# Patient Record
Sex: MALE | Race: WHITE | NOT HISPANIC OR LATINO | Employment: FULL TIME | URBAN - METROPOLITAN AREA
[De-identification: names, ages, dates, MRNs, and addresses within clinical notes are randomized per-mention and may not be internally consistent; named-entity substitution may affect disease eponyms.]

---

## 2017-12-21 ENCOUNTER — ALLSCRIPTS OFFICE VISIT (OUTPATIENT)
Dept: OTHER | Facility: OTHER | Age: 52
End: 2017-12-21

## 2017-12-22 NOTE — CONSULTS
Assessment   1  Hyperlipidemia (272 4) (E78 5)  2  Vertigo (780 4) (R42)  3  Family history of hypertension (V17 49) (Z82 49) : Mother, Father  4  Family history of coronary artery disease (V17 3) (Z82 49) : Father  5  Benign hypertension (401 1) (I10)    Plan   Benign hypertension    · Follow-up visit in 4 Months Evaluation and Treatment  Follow-up  Status: Hold For -    Scheduling  Requested for: [id number]  Ordered; For: Benign hypertension;  Ordered By: Shad Persons  Performed:   Due:     [id number]    Discussion/Summary      1  hypertension: Reviewed blood pressure log, and systolic blood pressure averaging in the 130s in 140s  I do feel he needs better blood pressure control  At this time we have collectively decided to pursue lifestyle modification, and I have recommended low-sodium diet, and some weight loss  His weight is just within the overweight range, and a 5 pound weight loss will help  He will continue monitoring his blood pressure at home, and I will see him in 4 months  If his blood pressures remain greater than 140 millimeter of mercury, will start an antihypertensive next visit  Dyslipidemia: Will obtain lipid panel from PCP's office  Continue atorvastatin for now  History of QT prolongation: Likely secondary to sertraline, however, ECG today reveals normal QT interval  Patient advised to check with pharmacist if he takes any over-the-counter medications to be sure will not interact with sertraline 2 prolonged QT interval     Overweight: BMI 26, mildly elevated in overweight range  Heart healthy diet and continued exercise recommended w/ goal of losing -- 5 lb over next 4 months  in 4 months to readdress HTN  Chief Complaint   np establish/ fam hx      History of Present Illness   Cardiology HPI Free Text Note Form [facility]: This is a very pleasant 55-year-old male with no prior cardiac history  He has a history of dyslipidemia and has been on atorvastatin for many years   In 2015 he was evaluated by Dr Mateo Lynn at [facility] for symptoms of vertigo secondary to an ear infection, and prolonged QT interval secondary to sertraline  He underwent a Holter monitor in February 2015 which was unremarkable, revealing rare PACs and PVCs  presents today to establish cardiovascular care for risk reduction purposes  His father had a myocardial infarction in his 76s  He has no history of tobacco abuse  He exercises several times a week including playing tennis, basketball, and jogging, and feels well with exertional activities without any symptoms  He states his cholesterol is well controlled by his PCP, on atorvastatin  His wife has been checking his blood pressures at home, and systolic blood pressures have been ranging from the 1 teens, up to the 150s, averaging in the 130s and 140s  His diet is fairly good, and he does not consume too much sodium  Review of Systems           Cardiac: No complaints of chest pain, no palpitations, no fainiting  Skin: No complaints of nonhealing sores or skin rash  Genitourinary: No complaints of recurrent urinary tract infections, frequent urination at night, difficult urination, blood in urine, kidney stones, loss of bladder control, no kidney or prostate problems, no erectile dysfunction  Psychological: panic attacks, but-- no depression,-- no anxiety,-- no difficulty concentrating-- and-- no palpitations present  General: No complaints of trouble sleeping, lack of energy, fatigue, appetite changes, weight changes, fever, frequent infections, or night sweats  Respiratory: No complaints of shortness of breath, cough with sputum, or wheezing  HEENT: No complaints of serious problems, hearing problems, nose problems, throat problems, or snoring  Gastrointestinal: No complaints of liver problems, nausea, vomiting, heartburn, constipation, bloody stools, diarrhea, problems swallowing, adbominal pain, or rectal bleeding  Hematologic: No complaints of bleeding disorders, anemia, blood clots, or excessive brusing  Musculoskeletal: arthritis-- and-- back pain, but-- no swelling/pain                ROS reviewed  Active Problems   1  Hyperlipidemia (272 4) (E78 5)  2  Vertigo (780 4) (R42)    Past Medical History      The active problems and past medical history were reviewed and updated today  Surgical History      The surgical history was reviewed and updated today  Family History   Mother    · Family history of hypertension (V17 49) (Z82 49)  Father    · Family history of coronary artery disease (V17 3) (Z82 49)   · Family history of hypertension (V17 49) (Z82 49)  Family History Reviewed: The family history was reviewed and updated today  Social History    · Never a smoker  The social history was reviewed and updated today  Current Meds   1  Aspir-81 81 MG Oral Tablet Delayed Release; one tablet by mouth daily; Therapy: [id number] to Recorded  2  Atorvastatin Calcium 40 MG Oral Tablet; Take 1 tablet daily; Therapy: 36Dno2577 to Recorded  3  Sertraline HCl - 50 MG Oral Tablet; Take 1 tablet daily; Therapy: 61Sol3077 to (Evaluate:28Njv2725) Recorded     The medication list was reviewed and updated today  Allergies   1  No Known Drug Allergies    Vitals   Signs   Heart Rate: 59  Pulse Quality: Normal, L Radial  Respiration Quality: Normal  Respiration: 16  Systolic: 761  Diastolic: 92  Height: 6 ft 1 in  Weight: 201 lb   BMI Calculated: 26 52  BSA Calculated: 2 16    Physical Exam        Constitutional - General appearance: No acute distress, well appearing and well nourished  Eyes - Conjunctiva and Sclera examination: Conjunctiva pink, sclera anicteric  Neck - Normal, no JVD   Pulmonary - Respiratory effort: No signs of respiratory distress  -- Auscultation of lungs: Clear to auscultation         Cardiovascular - Auscultation of heart: Normal rate and rhythm, normal S1 and S2, no murmurs  -- Pedal pulses: Normal, 2+ bilaterally  -- Examination of extremities for edema and/or varicosities: Normal        Abdomen - Soft  Musculoskeletal - Gait and station: Normal gait  Skin - Skin: Normal without rashes  Skin is warm and well perfused  Neurologic - Speech normal  No focal deficits  Psychiatric - Orientation to person, place, and time: Normal       End of Encounter Meds   1  Aspir-81 81 MG Oral Tablet Delayed Release; one tablet by mouth daily; Therapy: 87Pct6840 to Recorded  2  Atorvastatin Calcium 40 MG Oral Tablet; Take 1 tablet daily; Therapy: 31Dms3629 to Recorded  3  Sertraline HCl - 50 MG Oral Tablet; Take 1 tablet daily;      Therapy: 99NFQ7880 to (Evaluate:88Qhm1527) Recorded    Signatures    Electronically signed by : Carol Carreon DO; Dec 21 2017  3:14PM EST                       (Author)

## 2018-01-23 VITALS
RESPIRATION RATE: 16 BRPM | HEIGHT: 73 IN | DIASTOLIC BLOOD PRESSURE: 92 MMHG | WEIGHT: 201 LBS | SYSTOLIC BLOOD PRESSURE: 140 MMHG | BODY MASS INDEX: 26.64 KG/M2 | HEART RATE: 59 BPM

## 2018-06-14 ENCOUNTER — OFFICE VISIT (OUTPATIENT)
Dept: URGENT CARE | Facility: CLINIC | Age: 53
End: 2018-06-14
Payer: COMMERCIAL

## 2018-06-14 ENCOUNTER — APPOINTMENT (OUTPATIENT)
Dept: RADIOLOGY | Facility: CLINIC | Age: 53
End: 2018-06-14
Payer: COMMERCIAL

## 2018-06-14 VITALS
WEIGHT: 193 LBS | HEIGHT: 73 IN | HEART RATE: 50 BPM | RESPIRATION RATE: 18 BRPM | OXYGEN SATURATION: 100 % | TEMPERATURE: 98.5 F | DIASTOLIC BLOOD PRESSURE: 89 MMHG | SYSTOLIC BLOOD PRESSURE: 159 MMHG | BODY MASS INDEX: 25.58 KG/M2

## 2018-06-14 DIAGNOSIS — S99.922A INJURY OF TOE ON LEFT FOOT, INITIAL ENCOUNTER: Primary | ICD-10-CM

## 2018-06-14 DIAGNOSIS — S99.922A INJURY OF TOE ON LEFT FOOT, INITIAL ENCOUNTER: ICD-10-CM

## 2018-06-14 PROCEDURE — 73660 X-RAY EXAM OF TOE(S): CPT

## 2018-06-14 PROCEDURE — 99213 OFFICE O/P EST LOW 20 MIN: CPT | Performed by: PHYSICIAN ASSISTANT

## 2018-06-14 RX ORDER — ASPIRIN 81 MG/1
81 TABLET ORAL DAILY
COMMUNITY

## 2018-06-14 RX ORDER — CETIRIZINE HYDROCHLORIDE 10 MG/1
10 TABLET ORAL DAILY
COMMUNITY
End: 2019-04-09 | Stop reason: CLARIF

## 2018-06-14 RX ORDER — ATORVASTATIN CALCIUM 40 MG/1
40 TABLET, FILM COATED ORAL DAILY
COMMUNITY

## 2018-06-14 NOTE — PROGRESS NOTES
[address] [facility]        NAME: Georgiana Lazcano is a 46 y o  male  : 1965    MRN: [id number]  DATE: 2018  TIME: 2:12 PM    Assessment and Plan   Injury of toe on left foot, initial encounter [S99 922A]  1  Injury of toe on left foot, initial encounter  XR toe left great min 2 views     Patient Instructions     Rest foot and elevate when possible  Apply ice 10-20 minutes at a time at least 4 times a day  Tylenol/motrin as needed for pain  Follow up with PCP in 3-5 days  Proceed to  ER if symptoms worsen  Chief Complaint     Chief Complaint   Patient presents with    Toe Injury     Injured left great toe on tuesday  Complaining of pain in toe and metatarsal region  Limited ROM  History of Present Illness     A 59-year-old male presents with pain and swelling in left MCP x2 days  Patient was playing basketball when he felt pain in his left MCP  Patient took ibuprofen once since the injury and has not attempted any other palliative treatment  Patient denies numbness weakness tingling open wound warmth to touch fevers shortness breath  Review of Systems   Review of Systems   Constitutional: Negative for activity change, appetite change, chills, diaphoresis, fatigue, fever and unexpected weight change  Respiratory: Negative for apnea, cough, choking, chest tightness, shortness of breath, wheezing and stridor  Cardiovascular: Negative for chest pain, palpitations and leg swelling  Musculoskeletal: Positive for arthralgias, joint swelling and myalgias  Negative for back pain, gait problem, neck pain and neck stiffness           Current Medications       Current Outpatient Prescriptions:     aspirin (ECOTRIN LOW STRENGTH) 81 mg EC tablet, Take 81 mg by mouth daily, Disp: , Rfl:     atorvastatin (LIPITOR) 40 mg tablet, Take 40 mg by mouth daily, Disp: , Rfl:     cetirizine (ZyrTEC) 10 mg tablet, Take 10 mg by mouth daily, Disp: , Rfl:     Current Allergies     Allergies as of 06/14/2018    (No Known Allergies)            The following portions of the patient's history were reviewed and updated as appropriate: allergies, current medications, past family history, past medical history, past social history, past surgical history and problem list      History reviewed  No pertinent past medical history  History reviewed  No pertinent surgical history  No family history on file  Medications have been verified  Objective   /89   Pulse (!) 50   Temp 98 5 °F (36 9 °C) (Tympanic)   Resp 18   Ht 6' 1" (1 854 m)   Wt 87 5 kg (193 lb)   SpO2 100%   BMI 25 46 kg/m²        Physical Exam     Physical Exam   Constitutional: He appears well-developed and well-nourished  Cardiovascular: Normal rate, regular rhythm, normal heart sounds and intact distal pulses  Exam reveals no gallop and no friction rub  No murmur heard  Pulmonary/Chest: Effort normal and breath sounds normal  No respiratory distress  He has no wheezes  He has no rales  Musculoskeletal:        Left foot: There is tenderness, bony tenderness and swelling (left MCP)  There is normal range of motion, normal capillary refill (left MCP), no crepitus and no deformity

## 2019-04-09 ENCOUNTER — OFFICE VISIT (OUTPATIENT)
Dept: URGENT CARE | Facility: CLINIC | Age: 54
End: 2019-04-09
Payer: COMMERCIAL

## 2019-04-09 VITALS
WEIGHT: 195 LBS | DIASTOLIC BLOOD PRESSURE: 103 MMHG | TEMPERATURE: 97.9 F | HEIGHT: 73 IN | HEART RATE: 58 BPM | OXYGEN SATURATION: 100 % | RESPIRATION RATE: 18 BRPM | SYSTOLIC BLOOD PRESSURE: 175 MMHG | BODY MASS INDEX: 25.84 KG/M2

## 2019-04-09 DIAGNOSIS — J02.9 PHARYNGITIS, UNSPECIFIED ETIOLOGY: Primary | ICD-10-CM

## 2019-04-09 LAB — S PYO AG THROAT QL: NEGATIVE

## 2019-04-09 PROCEDURE — 87430 STREP A AG IA: CPT | Performed by: PHYSICIAN ASSISTANT

## 2019-04-09 PROCEDURE — 99213 OFFICE O/P EST LOW 20 MIN: CPT | Performed by: PHYSICIAN ASSISTANT

## 2019-04-09 PROCEDURE — 87070 CULTURE OTHR SPECIMN AEROBIC: CPT | Performed by: PHYSICIAN ASSISTANT

## 2019-04-11 LAB — BACTERIA THROAT CULT: NORMAL

## 2021-01-15 DIAGNOSIS — Z23 ENCOUNTER FOR IMMUNIZATION: ICD-10-CM

## 2021-01-19 ENCOUNTER — IMMUNIZATIONS (OUTPATIENT)
Dept: FAMILY MEDICINE CLINIC | Facility: HOSPITAL | Age: 56
End: 2021-01-19
Attending: INTERNAL MEDICINE

## 2021-01-19 DIAGNOSIS — Z23 ENCOUNTER FOR IMMUNIZATION: Primary | ICD-10-CM

## 2021-01-19 PROCEDURE — 0011A SARS-COV-2 / COVID-19 MRNA VACCINE (MODERNA) 100 MCG: CPT

## 2021-01-19 PROCEDURE — 91301 SARS-COV-2 / COVID-19 MRNA VACCINE (MODERNA) 100 MCG: CPT

## 2021-02-15 ENCOUNTER — IMMUNIZATIONS (OUTPATIENT)
Dept: FAMILY MEDICINE CLINIC | Facility: HOSPITAL | Age: 56
End: 2021-02-15

## 2021-02-15 DIAGNOSIS — Z23 ENCOUNTER FOR IMMUNIZATION: Primary | ICD-10-CM

## 2021-02-15 PROCEDURE — 0012A SARS-COV-2 / COVID-19 MRNA VACCINE (MODERNA) 100 MCG: CPT

## 2021-02-15 PROCEDURE — 91301 SARS-COV-2 / COVID-19 MRNA VACCINE (MODERNA) 100 MCG: CPT

## 2021-12-07 ENCOUNTER — IMMUNIZATIONS (OUTPATIENT)
Dept: FAMILY MEDICINE CLINIC | Facility: HOSPITAL | Age: 56
End: 2021-12-07

## 2021-12-07 DIAGNOSIS — Z23 ENCOUNTER FOR IMMUNIZATION: Primary | ICD-10-CM

## 2021-12-07 PROCEDURE — 91306 COVID-19 MODERNA VACC 0.25 ML BOOSTER: CPT

## 2021-12-07 PROCEDURE — 0064A COVID-19 MODERNA VACC 0.25 ML BOOSTER: CPT

## 2024-01-26 ENCOUNTER — HOSPITAL ENCOUNTER (EMERGENCY)
Facility: HOSPITAL | Age: 59
Discharge: HOME | End: 2024-01-26
Attending: EMERGENCY MEDICINE
Payer: COMMERCIAL

## 2024-01-26 VITALS
TEMPERATURE: 96.8 F | SYSTOLIC BLOOD PRESSURE: 137 MMHG | BODY MASS INDEX: 25.78 KG/M2 | DIASTOLIC BLOOD PRESSURE: 95 MMHG | WEIGHT: 195.4 LBS | HEART RATE: 73 BPM | OXYGEN SATURATION: 99 % | RESPIRATION RATE: 18 BRPM

## 2024-01-26 DIAGNOSIS — M54.50 ACUTE RIGHT-SIDED LOW BACK PAIN WITHOUT SCIATICA: Primary | ICD-10-CM

## 2024-01-26 PROCEDURE — 96372 THER/PROPH/DIAG INJ SC/IM: CPT

## 2024-01-26 PROCEDURE — 99283 EMERGENCY DEPT VISIT LOW MDM: CPT

## 2024-01-26 PROCEDURE — 99284 EMERGENCY DEPT VISIT MOD MDM: CPT | Performed by: EMERGENCY MEDICINE

## 2024-01-26 RX ORDER — KETOROLAC TROMETHAMINE 30 MG/ML
15 INJECTION, SOLUTION INTRAMUSCULAR; INTRAVENOUS ONCE
Status: COMPLETED | OUTPATIENT
Start: 2024-01-26 | End: 2024-01-26

## 2024-01-26 RX ORDER — NAPROXEN 500 MG/1
500 TABLET ORAL 2 TIMES DAILY PRN
Qty: 30 TABLET | Refills: 0 | Status: SHIPPED | OUTPATIENT
Start: 2024-01-26

## 2024-01-26 RX ORDER — DIAZEPAM 5 MG/1
5 TABLET ORAL EVERY 8 HOURS PRN
Qty: 16 TABLET | Refills: 0 | Status: SHIPPED | OUTPATIENT
Start: 2024-01-26

## 2024-01-26 RX ADMIN — KETOROLAC TROMETHAMINE 15 MG: 30 INJECTION, SOLUTION INTRAMUSCULAR; INTRAVENOUS at 13:14

## 2024-01-26 NOTE — ED PROVIDER NOTES
"History  Chief Complaint   Patient presents with    Back Pain     Started 2 days ago, been taking motrin 800mg every 8 hr. And tylenol.      Pleasant 58-year-old male to the emergency room with a history of right-sided back pain.  Patient reports that he had similar episode about 10 years ago and was seen at [facility] and given an epidural injection.  He reports that he was improved following that and has had no issues.  He reports over the last several days he has tried to do some \"calisthenics\" and started to have right-sided back pain.  No radiation into his leg.  No incontinence.  No numbness or tingling.  No fevers or chills.  No abdominal pain.    Patient has no history of cancer.  Is on no immunosuppressive therapy, chronic steroid use.  No recent fevers or chills.  No abdominal pain.  No hematuria or other urinary complaints.  No anticoagulation therapy.  No anti-platelet therapy.  No recent trauma.  No use of intravenous drugs or medications.  No recent back surgery.  No bowel or bladder incontinence.        History provided by:  Spouse and patient  Back Pain  Location:  Gluteal region and lumbar spine  Quality:  Aching and cramping  Radiates to:  Does not radiate  Pain severity:  Severe  Pain is:  Worse during the day  Onset quality:  Gradual  Duration:  2 days  Timing:  Constant  Progression:  Worsening  Context: physical stress    Relieved by:  Being still  Worsened by:  Movement  Associated symptoms: no abdominal pain, no abdominal swelling, no bladder incontinence, no bowel incontinence and no dysuria        Prior to Admission Medications   Prescriptions Last Dose Informant Patient Reported? Taking?   aspirin (ECOTRIN LOW STRENGTH) 81 mg EC tablet 1/26/2024 Self Yes Yes   Sig: Take 81 mg by mouth daily   atorvastatin (LIPITOR) 40 mg tablet 1/25/2024 Self Yes Yes   Sig: Take 40 mg by mouth daily   metFORMIN (GLUCOPHAGE) 500 mg tablet 1/26/2024  Yes Yes   Sig: Take 500 mg by mouth 2 (two) times a day with " Per history.   Has severe leesa-anal disease  See above     meals   sertraline (ZOLOFT) 50 mg tablet 1/26/2024  Yes Yes      Facility-Administered Medications: None       Past Medical History:   Diagnosis Date    Diabetes mellitus (HCC)     Hypercholesterolemia     Panic attack        History reviewed. No pertinent surgical history.    History reviewed. No pertinent family history.  I have reviewed and agree with the history as documented.    E-Cigarette/Vaping    E-Cigarette Use Never User      E-Cigarette/Vaping Substances     Social History     Tobacco Use    Smoking status: Never    Smokeless tobacco: Never   Vaping Use    Vaping status: Never Used   Substance Use Topics    Alcohol use: Yes     Alcohol/week: 12.0 standard drinks of alcohol     Types: 12 Cans of beer per week    Drug use: No       Review of Systems   Constitutional: Negative.    Respiratory: Negative.     Cardiovascular: Negative.    Gastrointestinal:  Negative for abdominal pain, bowel incontinence, diarrhea, nausea and vomiting.   Genitourinary:  Negative for bladder incontinence, difficulty urinating, dysuria, flank pain and hematuria.   Musculoskeletal:  Positive for back pain.   All other systems reviewed and are negative.      Physical Exam  Physical Exam  Vitals and nursing note reviewed.   Constitutional:       General: He is not in acute distress.     Appearance: He is normal weight. He is not ill-appearing or toxic-appearing.   HENT:      Head: Normocephalic and atraumatic.      Right Ear: External ear normal.      Left Ear: External ear normal.      Nose: Nose normal.      Mouth/Throat:      Mouth: Mucous membranes are moist.   Pulmonary:      Effort: Pulmonary effort is normal. No respiratory distress.   Abdominal:      General: Abdomen is flat. There is no distension.   Musculoskeletal:         General: Tenderness present. No signs of injury.      Cervical back: Normal.      Thoracic back: Normal.      Lumbar back: Tenderness present. No spasms or bony tenderness. Normal range of motion.         Back:    Skin:     Coloration: Skin is not pale.   Neurological:      General: No focal deficit present.      Mental Status: He is alert.   Psychiatric:         Mood and Affect: Mood normal.         Thought Content: Thought content normal.         Judgment: Judgment normal.         Vital Signs  ED Triage Vitals [01/26/24 1239]   Temperature Pulse Respirations Blood Pressure SpO2   (!) 96.8 °F (36 °C) 73 18 137/95 99 %      Temp Source Heart Rate Source Patient Position - Orthostatic VS BP Location FiO2 (%)   Temporal Monitor Standing Left arm --      Pain Score       8           Vitals:    01/26/24 1239   BP: 137/95   Pulse: 73   Patient Position - Orthostatic VS: Standing         Visual Acuity      ED Medications  Medications   ketorolac (TORADOL) injection 15 mg (15 mg Intramuscular Given 1/26/24 1314)       Diagnostic Studies  Results Reviewed       None                   No orders to display              Procedures  Procedures         ED Course  ED Course as of 01/26/24 1317   Fri Jan 26, 2024   1313 Discussed with patient likely clinical diagnosis and plans for outpatient treatment and recommended follow-up if no improvement.  Patient is agreement with same.                               SBIRT 22yo+      Flowsheet Row Most Recent Value   Initial Alcohol Screen: US AUDIT-C     1. How often do you have a drink containing alcohol? 0 Filed at: 01/26/2024 1241   2. How many drinks containing alcohol do you have on a typical day you are drinking?  0 Filed at: 01/26/2024 1241   3a. Male UNDER 65: How often do you have five or more drinks on one occasion? 0 Filed at: 01/26/2024 1241   Audit-C Score 0 Filed at: 01/26/2024 1241   JESSICA: How many times in the past year have you...    Used an illegal drug or used a prescription medication for non-medical reasons? Never Filed at: 01/26/2024 1241                      Medical Decision Making  Patient presented to the emergency department and a MSE was performed. The patient  was evaluated for complaint related to acute back pain. Patient is potentially at risk for, but not limited to, musculoskeletal pain, sciatica, degenerative disc disease, traumatic injury, occult fracture, discitis, osteomyelitis, spinal cord abscess  or other infectious etiology, spinal cord hemorrhage, conus medullaris, or cauda equina syndrome.  Several of these diagnoses have been evaluated and ruled out by history and physical.  As needed, patient will be further evaluated with laboratory and imaging studies.  Higher level diagnostics, such as CT imaging or ultrasound, may also be required.  Please see work-up portion of the note for further evaluation of patient's risk.  Socioeconomic factors were also considered as part of the decision-making process.  Unless otherwise stated in the chart or patient is admitted as elsewhere documented, any previously prescribed medications will be maintained.      Most likely diagnosis is consistent with muscle spasm.      Problems Addressed:  Acute right-sided low back pain without sciatica: complicated acute illness or injury    Risk  Prescription drug management.             Disposition  Final diagnoses:   Acute right-sided low back pain without sciatica     Time reflects when diagnosis was documented in both MDM as applicable and the Disposition within this note       Time User Action Codes Description Comment    1/26/2024  1:11 PM Gabriel Siegel Add [M54.50] Acute right-sided low back pain without sciatica           ED Disposition       ED Disposition   Discharge    Condition   Stable    Date/Time   Fri Jan 26, 2024 1310    Comment   Fabricio Andrade discharge to home/self care.                   Follow-up Information       Follow up With Specialties Details Why Contact Info    Dani Mcdonough MD Family Medicine  As needed [address] [facility] [address]  [phone]      Gamaliel Patel MD Pain Medicine, Interventional Radiology In 2 weeks If not better [address] [address]  [phone]              Patient's Medications   Discharge Prescriptions    DIAZEPAM (VALIUM) 5 MG TABLET    Take 1 tablet (5 mg total) by mouth every 8 (eight) hours as needed for anxiety or muscle spasms for up to 16 doses       Start Date: 1/26/2024 End Date: --       Order Dose: 5 mg       Quantity: 16 tablet    Refills: 0    NAPROXEN (NAPROSYN) 500 MG TABLET    Take 1 tablet (500 mg total) by mouth 2 (two) times a day as needed for mild pain or moderate pain       Start Date: 1/26/2024 End Date: --       Order Dose: 500 mg       Quantity: 30 tablet    Refills: 0           PDMP Review         Value Time User    PDMP Reviewed  Yes 1/26/2024  1:11 PM Gabriel Siegel DO            ED Provider  Electronically Signed by             Gabriel Siegel DO  01/26/24 [id number]

## 2024-01-26 NOTE — DISCHARGE INSTRUCTIONS
Please take medications as needed and prescribed.  Please return with any worsening.  If no improvement we do recommend follow-up with comprehensive spine program.    Thank you for choosing the emergency department at [facility]. We appreciated the opportunity and privilege to address your healthcare needs. We remain available to you should you require additional evaluation or assistance. We value your feedback and would appreciate the opportunity to address anything you identified as an opportunity to improve or where we excelled. If there are colleagues who deserve special recognition, please let us know! We hope you are feeling better soon!    Please also note that sometimes there are subtle abnormalities in your lab values that you may observe when you access your record online.  These are frequently not worrisome and if they are of concern we will have discussed them with you.  However, we always encourage that you discuss any concerns you may have or observe on your record with your primary care provider.  Please also be aware that voice transcription will occasionally recognize words or grammar differently than what was spoken.

## 2025-01-18 ENCOUNTER — APPOINTMENT (EMERGENCY)
Dept: RADIOLOGY | Facility: HOSPITAL | Age: 60
End: 2025-01-18
Payer: COMMERCIAL

## 2025-01-18 ENCOUNTER — HOSPITAL ENCOUNTER (EMERGENCY)
Facility: HOSPITAL | Age: 60
Discharge: HOME | End: 2025-01-18
Attending: EMERGENCY MEDICINE | Admitting: EMERGENCY MEDICINE
Payer: COMMERCIAL

## 2025-01-18 ENCOUNTER — APPOINTMENT (EMERGENCY)
Dept: CT IMAGING | Facility: HOSPITAL | Age: 60
End: 2025-01-18
Payer: COMMERCIAL

## 2025-01-18 VITALS
SYSTOLIC BLOOD PRESSURE: 127 MMHG | RESPIRATION RATE: 18 BRPM | OXYGEN SATURATION: 98 % | TEMPERATURE: 98.6 F | HEART RATE: 69 BPM | DIASTOLIC BLOOD PRESSURE: 87 MMHG

## 2025-01-18 DIAGNOSIS — I16.0 HYPERTENSIVE URGENCY: Primary | ICD-10-CM

## 2025-01-18 LAB
ALBUMIN SERPL BCG-MCNC: 4.6 G/DL (ref 3.5–5)
ALP SERPL-CCNC: 106 U/L (ref 34–104)
ALT SERPL W P-5'-P-CCNC: 15 U/L (ref 7–52)
ANION GAP SERPL CALCULATED.3IONS-SCNC: 10 MMOL/L (ref 4–13)
APTT PPP: 24 SECONDS (ref 23–34)
AST SERPL W P-5'-P-CCNC: 20 U/L (ref 13–39)
BASOPHILS # BLD AUTO: 0.04 THOUSANDS/ΜL (ref 0–0.1)
BASOPHILS NFR BLD AUTO: 0 % (ref 0–1)
BILIRUB SERPL-MCNC: 0.69 MG/DL (ref 0.2–1)
BNP SERPL-MCNC: 16 PG/ML (ref 0–100)
BUN SERPL-MCNC: 15 MG/DL (ref 5–25)
CALCIUM SERPL-MCNC: 9.8 MG/DL (ref 8.4–10.2)
CARDIAC TROPONIN I PNL SERPL HS: 3 NG/L (ref ?–50)
CHLORIDE SERPL-SCNC: 101 MMOL/L (ref 96–108)
CO2 SERPL-SCNC: 26 MMOL/L (ref 21–32)
CREAT SERPL-MCNC: 0.93 MG/DL (ref 0.6–1.3)
EOSINOPHIL # BLD AUTO: 0.1 THOUSAND/ΜL (ref 0–0.61)
EOSINOPHIL NFR BLD AUTO: 1 % (ref 0–6)
ERYTHROCYTE [DISTWIDTH] IN BLOOD BY AUTOMATED COUNT: 13.1 % (ref 11.6–15.1)
GFR SERPL CREATININE-BSD FRML MDRD: 89 ML/MIN/1.73SQ M
GLUCOSE SERPL-MCNC: 128 MG/DL (ref 65–140)
HCT VFR BLD AUTO: 49.9 % (ref 36.5–49.3)
HGB BLD-MCNC: 16.7 G/DL (ref 12–17)
IMM GRANULOCYTES # BLD AUTO: 0.04 THOUSAND/UL (ref 0–0.2)
IMM GRANULOCYTES NFR BLD AUTO: 0 % (ref 0–2)
INR PPP: 0.99 (ref 0.85–1.19)
LYMPHOCYTES # BLD AUTO: 2.79 THOUSANDS/ΜL (ref 0.6–4.47)
LYMPHOCYTES NFR BLD AUTO: 26 % (ref 14–44)
MCH RBC QN AUTO: 29.7 PG (ref 26.8–34.3)
MCHC RBC AUTO-ENTMCNC: 33.5 G/DL (ref 31.4–37.4)
MCV RBC AUTO: 89 FL (ref 82–98)
MONOCYTES # BLD AUTO: 0.66 THOUSAND/ΜL (ref 0.17–1.22)
MONOCYTES NFR BLD AUTO: 6 % (ref 4–12)
NEUTROPHILS # BLD AUTO: 7.3 THOUSANDS/ΜL (ref 1.85–7.62)
NEUTS SEG NFR BLD AUTO: 67 % (ref 43–75)
NRBC BLD AUTO-RTO: 0 /100 WBCS
PLATELET # BLD AUTO: 326 THOUSANDS/UL (ref 149–390)
PMV BLD AUTO: 9.5 FL (ref 8.9–12.7)
POTASSIUM SERPL-SCNC: 3.8 MMOL/L (ref 3.5–5.3)
PROT SERPL-MCNC: 7.4 G/DL (ref 6.4–8.4)
PROTHROMBIN TIME: 13.5 SECONDS (ref 12.3–15)
RBC # BLD AUTO: 5.62 MILLION/UL (ref 3.88–5.62)
SODIUM SERPL-SCNC: 137 MMOL/L (ref 135–147)
WBC # BLD AUTO: 10.93 THOUSAND/UL (ref 4.31–10.16)

## 2025-01-18 PROCEDURE — 85610 PROTHROMBIN TIME: CPT | Performed by: EMERGENCY MEDICINE

## 2025-01-18 PROCEDURE — 80053 COMPREHEN METABOLIC PANEL: CPT | Performed by: EMERGENCY MEDICINE

## 2025-01-18 PROCEDURE — 70496 CT ANGIOGRAPHY HEAD: CPT

## 2025-01-18 PROCEDURE — 83880 ASSAY OF NATRIURETIC PEPTIDE: CPT | Performed by: EMERGENCY MEDICINE

## 2025-01-18 PROCEDURE — 85730 THROMBOPLASTIN TIME PARTIAL: CPT | Performed by: EMERGENCY MEDICINE

## 2025-01-18 PROCEDURE — 36415 COLL VENOUS BLD VENIPUNCTURE: CPT | Performed by: EMERGENCY MEDICINE

## 2025-01-18 PROCEDURE — 99284 EMERGENCY DEPT VISIT MOD MDM: CPT

## 2025-01-18 PROCEDURE — 70498 CT ANGIOGRAPHY NECK: CPT

## 2025-01-18 PROCEDURE — 85025 COMPLETE CBC W/AUTO DIFF WBC: CPT | Performed by: EMERGENCY MEDICINE

## 2025-01-18 PROCEDURE — 71045 X-RAY EXAM CHEST 1 VIEW: CPT

## 2025-01-18 PROCEDURE — 84484 ASSAY OF TROPONIN QUANT: CPT | Performed by: EMERGENCY MEDICINE

## 2025-01-18 PROCEDURE — 99285 EMERGENCY DEPT VISIT HI MDM: CPT | Performed by: EMERGENCY MEDICINE

## 2025-01-18 PROCEDURE — 93005 ELECTROCARDIOGRAM TRACING: CPT

## 2025-01-18 RX ORDER — CLONIDINE HYDROCHLORIDE 0.1 MG/1
0.2 TABLET ORAL ONCE
Status: COMPLETED | OUTPATIENT
Start: 2025-01-18 | End: 2025-01-18

## 2025-01-18 RX ADMIN — IOHEXOL 75 ML: 350 INJECTION, SOLUTION INTRAVENOUS at 18:14

## 2025-01-18 RX ADMIN — CLONIDINE HYDROCHLORIDE 0.2 MG: 0.1 TABLET ORAL at 17:29

## 2025-01-18 NOTE — ED PROVIDER NOTES
Time reflects when diagnosis was documented in both MDM as applicable and the Disposition within this note       Time User Action Codes Description Comment    1/18/2025  7:44 PM Miguel King Add [I16.0] Hypertensive urgency           ED Disposition       ED Disposition   Discharge    Condition   Stable    Date/Time   Sat Jan 18, 2025  7:44 PM    Comment   Fabricio Andrade discharge to home/self care.                   Assessment & Plan       Medical Decision Making  59-year-old male presents to the emergency department for evaluation of elevated blood pressure, differential diagnosis include intracranial process, aneurysm, subarachnoid hemorrhage, hypertensive urgency, hypertensive emergency, renal artery stenosis, polycystic kidney disease, SANDRA, dehydration, will perform CT head, neck, cardiac enzymes, evaluate for endorgan damage, provide patient with clonidine for blood pressure management, and reassess.    Amount and/or Complexity of Data Reviewed  Labs: ordered.  Radiology: ordered.    Risk  Prescription drug management.        ED Course as of 01/19/25 0054   Sun Jan 19, 2025   0051 Discussed results with patient, BP normalized. Patient will f/u with PCP outpatient this week. Strict return precautions given. Patient understands and agrees with treatment plan.        Medications   cloNIDine (CATAPRES) tablet 0.2 mg (0.2 mg Oral Given 1/18/25 1729)   iohexol (OMNIPAQUE) 350 MG/ML injection (MULTI-DOSE) 75 mL (75 mL Intravenous Given 1/18/25 1814)       ED Risk Strat Scores   HEART Risk Score      Flowsheet Row Most Recent Value   Heart Score Risk Calculator    History 0 Filed at: 01/18/2025 1723   ECG 0 Filed at: 01/18/2025 1723   Age 1 Filed at: 01/18/2025 1723   Risk Factors 1 Filed at: 01/18/2025 1723   Troponin 0 Filed at: 01/18/2025 1723   HEART Score 2 Filed at: 01/18/2025 1723          HEART Risk Score      Flowsheet Row Most Recent Value   Heart Score Risk Calculator    History 0 Filed at: 01/18/2025 1723  "  ECG 0 Filed at: 01/18/2025 1723   Age 1 Filed at: 01/18/2025 1723   Risk Factors 1 Filed at: 01/18/2025 1723   Troponin 0 Filed at: 01/18/2025 1723   HEART Score 2 Filed at: 01/18/2025 1723                            SBIRT 22yo+      Flowsheet Row Most Recent Value   Initial Alcohol Screen: US AUDIT-C     1. How often do you have a drink containing alcohol? 0 Filed at: 01/18/2025 1713   2. How many drinks containing alcohol do you have on a typical day you are drinking?  0 Filed at: 01/18/2025 1713   3a. Male UNDER 65: How often do you have five or more drinks on one occasion? 0 Filed at: 01/18/2025 1713   Audit-C Score 0 Filed at: 01/18/2025 1713   JESSICA: How many times in the past year have you...    Used an illegal drug or used a prescription medication for non-medical reasons? Never Filed at: 01/18/2025 1713                            History of Present Illness       Chief Complaint   Patient presents with    Hypertension     Patient presents to the ED with complaints of high blood pressure today. The patient reports that he was feeling \"off\" today. He denies chest pain and denies lightheadedness.        Past Medical History:   Diagnosis Date    Diabetes mellitus (HCC)     Hypercholesterolemia     Panic attack       History reviewed. No pertinent surgical history.   History reviewed. No pertinent family history.   Social History     Tobacco Use    Smoking status: Never    Smokeless tobacco: Never   Vaping Use    Vaping status: Never Used   Substance Use Topics    Alcohol use: Yes     Alcohol/week: 12.0 standard drinks of alcohol     Types: 12 Cans of beer per week    Drug use: No      E-Cigarette/Vaping    E-Cigarette Use Never User       E-Cigarette/Vaping Substances      I have reviewed and agree with the history as documented.     59-year-old male with past medical history of diabetes, high cholesterol, panic attacks, presents to the emergency department for evaluation of elevated blood pressure.  Patient " states that over the past week, he has been feeling off.  He reports nonspecific dizziness/lightheadedness.  Patient states that this past week, he had episode of palpitations which resolved.  Patient was not sure if this was his panic attacks.  He denies any chills, fevers, sweats, chest pain, shortness of breath, GI or  complaints.      Hypertension  Associated symptoms: dizziness    Associated symptoms: no abdominal pain, no chest pain, no ear pain, no fever, no hematuria, no palpitations, no shortness of breath and not vomiting        Review of Systems   Constitutional:  Negative for chills and fever.   HENT:  Negative for ear pain and sore throat.    Eyes:  Negative for pain and visual disturbance.   Respiratory:  Negative for cough and shortness of breath.    Cardiovascular:  Negative for chest pain and palpitations.   Gastrointestinal:  Negative for abdominal pain and vomiting.   Genitourinary:  Negative for dysuria and hematuria.   Musculoskeletal:  Negative for arthralgias and back pain.   Skin:  Negative for color change and rash.   Neurological:  Positive for dizziness and light-headedness. Negative for seizures and syncope.   All other systems reviewed and are negative.          Objective       ED Triage Vitals [01/18/25 1714]   Temperature Pulse Blood Pressure Respirations SpO2 Patient Position - Orthostatic VS   98.6 °F (37 °C) 91 (!) 192/102 16 98 % Sitting      Temp Source Heart Rate Source BP Location FiO2 (%) Pain Score    Temporal Monitor Right arm -- No Pain      Vitals      Date and Time Temp Pulse SpO2 Resp BP Pain Score FACES Pain Rating User   01/18/25 1930 -- 69 98 % 18 127/87 -- -- [facility]   01/18/25 1900 -- 75 98 % 18 129/81 -- -- [facility]   01/18/25 1830 -- 68 97 % 16 132/84 -- -- [facility]   01/18/25 1820 -- 72 95 % 18 145/84 -- -- [facility]   01/18/25 1800 -- 68 99 % -- 148/86 -- -- [facility]   01/18/25 1745 -- 70 100 % -- 164/94 -- -- [facility]   01/18/25 1730 -- 86 98 % 18 157/91 -- -- [facility]   01/18/25 1729 -- -- --  -- 192/102 -- -- [facility]   01/18/25 1714 98.6 °F (37 °C) 91 98 % 16 192/102 No Pain -- RR            Physical Exam  Vitals and nursing note reviewed.   Constitutional:       General: He is not in acute distress.     Appearance: He is well-developed.   HENT:      Head: Normocephalic and atraumatic.   Eyes:      Conjunctiva/sclera: Conjunctivae normal.   Cardiovascular:      Rate and Rhythm: Normal rate and regular rhythm.      Heart sounds: No murmur heard.  Pulmonary:      Effort: Pulmonary effort is normal. No respiratory distress.      Breath sounds: Normal breath sounds.   Abdominal:      Palpations: Abdomen is soft.      Tenderness: There is no abdominal tenderness.   Musculoskeletal:         General: No swelling.      Cervical back: Neck supple.   Skin:     General: Skin is warm and dry.      Capillary Refill: Capillary refill takes less than 2 seconds.   Neurological:      General: No focal deficit present.      Mental Status: He is alert and oriented to person, place, and time.      Cranial Nerves: No cranial nerve deficit.      Sensory: No sensory deficit.      Motor: No weakness.      Coordination: Coordination normal.   Psychiatric:         Mood and Affect: Mood normal.         Results Reviewed       Procedure Component Value Units Date/Time    HS Troponin 0hr (reflex protocol) [[id number]]  (Normal) Collected: 01/18/25 1819    Lab Status: Final result Specimen: Blood from Arm, Left Updated: 01/18/25 1846     hs TnI 0hr 3 ng/L     Comprehensive metabolic panel [[id number]]  (Abnormal) Collected: 01/18/25 1743    Lab Status: Final result Specimen: Blood from Arm, Left Updated: 01/18/25 1805     Sodium 137 mmol/L      Potassium 3.8 mmol/L      Chloride 101 mmol/L      CO2 26 mmol/L      ANION GAP 10 mmol/L      BUN 15 mg/dL      Creatinine 0.93 mg/dL      Glucose 128 mg/dL      Calcium 9.8 mg/dL      AST 20 U/L      ALT 15 U/L      Alkaline Phosphatase 106 U/L      Total Protein 7.4 g/dL      Albumin 4.6 g/dL       Total Bilirubin 0.69 mg/dL      eGFR 89 ml/min/1.73sq m     Narrative:      [employer] guidelines for Chronic Kidney Disease (CKD):     Stage 1 with normal or high GFR (GFR > 90 mL/min/1.73 square meters)    Stage 2 Mild CKD (GFR = 60-89 mL/min/1.73 square meters)    Stage 3A Moderate CKD (GFR = 45-59 mL/min/1.73 square meters)    Stage 3B Moderate CKD (GFR = 30-44 mL/min/1.73 square meters)    Stage 4 Severe CKD (GFR = 15-29 mL/min/1.73 square meters)    Stage 5 End Stage CKD (GFR <15 mL/min/1.73 square meters)  Note: GFR calculation is accurate only with a steady state creatinine    APTT [[id number]]  (Normal) Collected: 01/18/25 1743    Lab Status: Final result Specimen: Blood from Arm, Left Updated: 01/18/25 1801     PTT 24 seconds     Protime-INR [[id number]]  (Normal) Collected: 01/18/25 1743    Lab Status: Final result Specimen: Blood from Arm, Left Updated: 01/18/25 1801     Protime 13.5 seconds      INR 0.99    Narrative:      INR Therapeutic Range    Indication                                             INR Range      Atrial Fibrillation                                               2.0-3.0  Hypercoagulable State                                    2.0.2.3  Left Ventricular Asist Device                            2.0-3.0  Mechanical Heart Valve                                  -    Aortic(with afib, MI, embolism, HF, LA enlargement,    and/or coagulopathy)                                     2.0-3.0 (2.5-3.5)     Mitral                                                             2.5-3.5  Prosthetic/Bioprosthetic Heart Valve               2.0-3.0  Venous thromboembolism (VTE: VT, PE        2.0-3.0    B-Type Natriuretic Peptide(BNP) [[id number]]  (Normal) Collected: 01/18/25 1723    Lab Status: Final result Specimen: Blood from Arm, Left Updated: 01/18/25 1756     BNP 16 pg/mL     CBC and differential [[id number]]  (Abnormal) Collected: 01/18/25 1723    Lab Status: Final result Specimen:  Blood from Arm, Left Updated: 01/18/25 1735     WBC 10.93 Thousand/uL      RBC 5.62 Million/uL      Hemoglobin 16.7 g/dL      Hematocrit 49.9 %      MCV 89 fL      MCH 29.7 pg      MCHC 33.5 g/dL      RDW 13.1 %      MPV 9.5 fL      Platelets 326 Thousands/uL      nRBC 0 /100 WBCs      Segmented % 67 %      Immature Grans % 0 %      Lymphocytes % 26 %      Monocytes % 6 %      Eosinophils Relative 1 %      Basophils Relative 0 %      Absolute Neutrophils 7.30 Thousands/µL      Absolute Immature Grans 0.04 Thousand/uL      Absolute Lymphocytes 2.79 Thousands/µL      Absolute Monocytes 0.66 Thousand/µL      Eosinophils Absolute 0.10 Thousand/µL      Basophils Absolute 0.04 Thousands/µL             CTA head and neck with and without contrast   Final Interpretation by Jose C Andrews MD (01/18 1936)         1. No evidence of acute infarct, intracranial hemorrhage or mass.   2. No stenosis, dissection or occlusion of the carotid or vertebral arteries or major vessels of the [place].                  Workstation performed: [id number]         X-ray chest 1 view portable    (Results Pending)       Procedures    ED Medication and Procedure Management   Prior to Admission Medications   Prescriptions Last Dose Informant Patient Reported? Taking?   aspirin (ECOTRIN LOW STRENGTH) 81 mg EC tablet  Self Yes No   Sig: Take 81 mg by mouth daily   atorvastatin (LIPITOR) 40 mg tablet  Self Yes No   Sig: Take 40 mg by mouth daily   diazepam (VALIUM) 5 mg tablet   No No   Sig: Take 1 tablet (5 mg total) by mouth every 8 (eight) hours as needed for anxiety or muscle spasms for up to 16 doses   metFORMIN (GLUCOPHAGE) 500 mg tablet   Yes No   Sig: Take 500 mg by mouth 2 (two) times a day with meals   naproxen (NAPROSYN) 500 mg tablet   No No   Sig: Take 1 tablet (500 mg total) by mouth 2 (two) times a day as needed for mild pain or moderate pain   sertraline (ZOLOFT) 50 mg tablet   Yes No      Facility-Administered  Medications: None     Discharge Medication List as of 1/18/2025  7:45 PM        CONTINUE these medications which have NOT CHANGED    Details   aspirin (ECOTRIN LOW STRENGTH) 81 mg EC tablet Take 81 mg by mouth daily, Historical Med      atorvastatin (LIPITOR) 40 mg tablet Take 40 mg by mouth daily, Historical Med      diazepam (VALIUM) 5 mg tablet Take 1 tablet (5 mg total) by mouth every 8 (eight) hours as needed for anxiety or muscle spasms for up to 16 doses, Starting Fri 1/26/2024, Normal      metFORMIN (GLUCOPHAGE) 500 mg tablet Take 500 mg by mouth 2 (two) times a day with meals, Historical Med      naproxen (NAPROSYN) 500 mg tablet Take 1 tablet (500 mg total) by mouth 2 (two) times a day as needed for mild pain or moderate pain, Starting Fri 1/26/2024, Normal      sertraline (ZOLOFT) 50 mg tablet Starting Mon 1/21/2019, Historical Med           No discharge procedures on file.  ED SEPSIS DOCUMENTATION   Time reflects when diagnosis was documented in both MDM as applicable and the Disposition within this note       Time User Action Codes Description Comment    1/18/2025  7:44 PM Miguel King Add [I16.0] Hypertensive urgency                  Miguel King,   01/19/25 0054

## 2025-01-19 ENCOUNTER — HOSPITAL ENCOUNTER (EMERGENCY)
Facility: HOSPITAL | Age: 60
Discharge: HOME | End: 2025-01-19
Attending: STUDENT IN AN ORGANIZED HEALTH CARE EDUCATION/TRAINING PROGRAM | Admitting: EMERGENCY MEDICINE
Payer: COMMERCIAL

## 2025-01-19 VITALS
SYSTOLIC BLOOD PRESSURE: 169 MMHG | DIASTOLIC BLOOD PRESSURE: 94 MMHG | BODY MASS INDEX: 24.08 KG/M2 | WEIGHT: 182.54 LBS | HEART RATE: 51 BPM | OXYGEN SATURATION: 98 % | RESPIRATION RATE: 18 BRPM | TEMPERATURE: 98.1 F

## 2025-01-19 DIAGNOSIS — I10 HYPERTENSION: Primary | ICD-10-CM

## 2025-01-19 LAB
ALBUMIN SERPL BCG-MCNC: 4.6 G/DL (ref 3.5–5)
ALP SERPL-CCNC: 108 U/L (ref 34–104)
ALT SERPL W P-5'-P-CCNC: 14 U/L (ref 7–52)
ANION GAP SERPL CALCULATED.3IONS-SCNC: 6 MMOL/L (ref 4–13)
AST SERPL W P-5'-P-CCNC: 19 U/L (ref 13–39)
BASOPHILS # BLD AUTO: 0.03 THOUSANDS/ΜL (ref 0–0.1)
BASOPHILS NFR BLD AUTO: 1 % (ref 0–1)
BILIRUB SERPL-MCNC: 0.94 MG/DL (ref 0.2–1)
BUN SERPL-MCNC: 16 MG/DL (ref 5–25)
CALCIUM SERPL-MCNC: 9.9 MG/DL (ref 8.4–10.2)
CARDIAC TROPONIN I PNL SERPL HS: 3 NG/L (ref ?–50)
CHLORIDE SERPL-SCNC: 101 MMOL/L (ref 96–108)
CO2 SERPL-SCNC: 29 MMOL/L (ref 21–32)
CREAT SERPL-MCNC: 1.06 MG/DL (ref 0.6–1.3)
EOSINOPHIL # BLD AUTO: 0.14 THOUSAND/ΜL (ref 0–0.61)
EOSINOPHIL NFR BLD AUTO: 2 % (ref 0–6)
ERYTHROCYTE [DISTWIDTH] IN BLOOD BY AUTOMATED COUNT: 13.2 % (ref 11.6–15.1)
GFR SERPL CREATININE-BSD FRML MDRD: 76 ML/MIN/1.73SQ M
GLUCOSE SERPL-MCNC: 171 MG/DL (ref 65–140)
HCT VFR BLD AUTO: 47.4 % (ref 36.5–49.3)
HGB BLD-MCNC: 15.8 G/DL (ref 12–17)
IMM GRANULOCYTES # BLD AUTO: 0.02 THOUSAND/UL (ref 0–0.2)
IMM GRANULOCYTES NFR BLD AUTO: 0 % (ref 0–2)
LYMPHOCYTES # BLD AUTO: 1.73 THOUSANDS/ΜL (ref 0.6–4.47)
LYMPHOCYTES NFR BLD AUTO: 27 % (ref 14–44)
MCH RBC QN AUTO: 29.5 PG (ref 26.8–34.3)
MCHC RBC AUTO-ENTMCNC: 33.3 G/DL (ref 31.4–37.4)
MCV RBC AUTO: 88 FL (ref 82–98)
MONOCYTES # BLD AUTO: 0.52 THOUSAND/ΜL (ref 0.17–1.22)
MONOCYTES NFR BLD AUTO: 8 % (ref 4–12)
NEUTROPHILS # BLD AUTO: 3.99 THOUSANDS/ΜL (ref 1.85–7.62)
NEUTS SEG NFR BLD AUTO: 62 % (ref 43–75)
NRBC BLD AUTO-RTO: 0 /100 WBCS
PLATELET # BLD AUTO: 308 THOUSANDS/UL (ref 149–390)
PMV BLD AUTO: 9.5 FL (ref 8.9–12.7)
POTASSIUM SERPL-SCNC: 4.2 MMOL/L (ref 3.5–5.3)
PROT SERPL-MCNC: 7.4 G/DL (ref 6.4–8.4)
RBC # BLD AUTO: 5.36 MILLION/UL (ref 3.88–5.62)
SODIUM SERPL-SCNC: 136 MMOL/L (ref 135–147)
WBC # BLD AUTO: 6.43 THOUSAND/UL (ref 4.31–10.16)

## 2025-01-19 PROCEDURE — 80053 COMPREHEN METABOLIC PANEL: CPT | Performed by: PHYSICIAN ASSISTANT

## 2025-01-19 PROCEDURE — 85025 COMPLETE CBC W/AUTO DIFF WBC: CPT | Performed by: PHYSICIAN ASSISTANT

## 2025-01-19 PROCEDURE — 99284 EMERGENCY DEPT VISIT MOD MDM: CPT | Performed by: PHYSICIAN ASSISTANT

## 2025-01-19 PROCEDURE — 84484 ASSAY OF TROPONIN QUANT: CPT | Performed by: PHYSICIAN ASSISTANT

## 2025-01-19 PROCEDURE — 99283 EMERGENCY DEPT VISIT LOW MDM: CPT

## 2025-01-19 PROCEDURE — 36415 COLL VENOUS BLD VENIPUNCTURE: CPT | Performed by: PHYSICIAN ASSISTANT

## 2025-01-19 RX ORDER — CLONIDINE HYDROCHLORIDE 0.1 MG/1
0.2 TABLET ORAL ONCE
Status: COMPLETED | OUTPATIENT
Start: 2025-01-19 | End: 2025-01-19

## 2025-01-19 RX ORDER — AMLODIPINE BESYLATE 5 MG/1
5 TABLET ORAL DAILY
Qty: 30 TABLET | Refills: 0 | Status: SHIPPED | OUTPATIENT
Start: 2025-01-19

## 2025-01-19 RX ADMIN — CLONIDINE HYDROCHLORIDE 0.2 MG: 0.1 TABLET ORAL at 14:49

## 2025-01-19 NOTE — DISCHARGE INSTRUCTIONS
You are seen in the emergency department for elevated blood pressure, your CT scan, blood work, and EKG were unremarkable, please follow-up with your primary care doctor outpatient for further evaluation.  If your symptoms worsen or persist, please return to the emergency department immediately.

## 2025-01-19 NOTE — ED CARE HANDOFF
Emergency Department Sign Out Note        Sign out and transfer of care from Dr. Tompkins. See Separate Emergency Department note.     The patient, Fabricio Andrade, was evaluated by the previous provider for HTN.    Workup Completed:  Labs    ED Course / Workup Pending (followup):  Reassessment                                     Procedures  Medical Decision Making  Amount and/or Complexity of Data Reviewed  Labs: ordered.    Risk  Prescription drug management.            Disposition  Final diagnoses:   None     ED Disposition       None          Follow-up Information    None       Patient's Medications   Discharge Prescriptions    No medications on file     No discharge procedures on file.       ED Provider  Electronically Signed by     Miguel King DO  01/19/25 3422

## 2025-01-19 NOTE — DISCHARGE INSTRUCTIONS
Please follow-up with your PCP tomorrow.  Please return to the emergency department for any new or worsening symptoms.

## 2025-01-19 NOTE — ED PROVIDER NOTES
Time reflects when diagnosis was documented in both MDM as applicable and the Disposition within this note       Time User Action Codes Description Comment    1/19/2025  3:19 PM Lea Recinos Add [I10] Hypertension           ED Disposition       ED Disposition   Discharge    Condition   Stable    Date/Time   Sun Jan 19, 2025  3:19 PM    Comment   Fabricio Andrade discharge to home/self care.                   Assessment & Plan       Medical Decision Making  59-year-old male presented to the emergency department for evaluation of high blood pressure.  Vitals and medical record reviewed.  Patient at risk for the following but not limited to asymptomatic hypertension, hypertensive urgency versus emergency.  Laboratory findings unremarkable.  Blood pressure improved with oral medication.  Patient remained asymptomatic in the emergency department.  Discussed the importance of follow-up with PCP patient will call tomorrow.  Prescribed low-dose Norvasc.  Patient was agreeable this treatment plan he was educated on return precautions and verbalized understanding.  Patient was clinically and hemodynamically stable for discharge    Amount and/or Complexity of Data Reviewed  Labs: ordered.    Risk  Prescription drug management.        ED Course as of 01/19/25 1543   Sun Jan 19, 2025   1503 Blood Pressure: 163/91   1518 Laboratory findings unremarkable.  Blood pressure improved.       Medications   cloNIDine (CATAPRES) tablet 0.2 mg (0.2 mg Oral Given 1/19/25 1449)       ED Risk Strat Scores                          SBIRT 22yo+      Flowsheet Row Most Recent Value   Initial Alcohol Screen: US AUDIT-C     1. How often do you have a drink containing alcohol? 0 Filed at: 01/19/2025 1430   2. How many drinks containing alcohol do you have on a typical day you are drinking?  0 Filed at: 01/19/2025 1430   3a. Male UNDER 65: How often do you have five or more drinks on one occasion? 0 Filed at: 01/19/2025 1430   3b. FEMALE Any Age, or  "MALE 65+: How often do you have 4 or more drinks on one occassion? 0 Filed at: 01/19/2025 1430   Audit-C Score 0 Filed at: 01/19/2025 1430   JESSICA: How many times in the past year have you...    Used an illegal drug or used a prescription medication for non-medical reasons? Never Filed at: 01/19/2025 1430                            History of Present Illness       Chief Complaint   Patient presents with    Hypertension     Pt arrives to ED for a complaint of high BP was seen yesterday for same. Pt reports he took it 3x in 20 minutes. Highest BP at home. 196/117        Past Medical History:   Diagnosis Date    Diabetes mellitus (HCC)     Hypercholesterolemia     Panic attack       History reviewed. No pertinent surgical history.   History reviewed. No pertinent family history.   Social History     Tobacco Use    Smoking status: Never    Smokeless tobacco: Never   Vaping Use    Vaping status: Never Used   Substance Use Topics    Alcohol use: Yes     Alcohol/week: 12.0 standard drinks of alcohol     Types: 12 Cans of beer per week    Drug use: No      E-Cigarette/Vaping    E-Cigarette Use Never User       E-Cigarette/Vaping Substances      I have reviewed and agree with the history as documented.     59-year-old male presents to the emergency department for evaluation of high blood pressure.  Patient was seen in the emergency department yesterday because he was not feeling right.  Found to have hypertensive urgency.  Had unremarkable workup.  Patient states he checked his blood pressure at home and again found it to be elevated.  Patient has no complaints today.  States he feels \"great\".  Patient does report he has been off Lexapro for about 5 months.  States he did have what he believes was a panic attack at the movie theater the other day.  Denies any other recent stressors.  He denies any headaches visual changes dizziness confusion.  Denies any chest pain or shortness of breath.        Review of Systems "   Constitutional: Negative.    Respiratory: Negative.     Cardiovascular: Negative.    Gastrointestinal: Negative.    Musculoskeletal: Negative.    Skin: Negative.    Neurological: Negative.    All other systems reviewed and are negative.          Objective       ED Triage Vitals [01/19/25 1428]   Temperature Pulse Blood Pressure Respirations SpO2 Patient Position - Orthostatic VS   98.1 °F (36.7 °C) (!) 53 (!) 211/103 18 99 % Lying      Temp Source Heart Rate Source BP Location FiO2 (%) Pain Score    Temporal Monitor Right arm -- No Pain      Vitals      Date and Time Temp Pulse SpO2 Resp BP Pain Score FACES Pain Rating User   01/19/25 1515 -- 51 98 % 18 169/94 -- -- RR   01/19/25 1500 -- 51 98 % 18 163/91 -- -- RR   01/19/25 1428 98.1 °F (36.7 °C) 53 99 % 18 211/103 No Pain -- MY            Physical Exam  Vitals and nursing note reviewed.   Constitutional:       General: He is not in acute distress.     Appearance: Normal appearance. He is not ill-appearing, toxic-appearing or diaphoretic.   HENT:      Head: Normocephalic.      Mouth/Throat:      Mouth: Mucous membranes are moist.   Eyes:      Conjunctiva/sclera: Conjunctivae normal.      Pupils: Pupils are equal, round, and reactive to light.   Cardiovascular:      Rate and Rhythm: Normal rate and regular rhythm.   Pulmonary:      Effort: Pulmonary effort is normal.      Breath sounds: Normal breath sounds. No stridor. No wheezing, rhonchi or rales.   Chest:      Chest wall: No tenderness.   Musculoskeletal:      Cervical back: Normal range of motion.   Skin:     General: Skin is warm and dry.   Neurological:      General: No focal deficit present.      Mental Status: He is alert and oriented to person, place, and time.   Psychiatric:         Mood and Affect: Mood normal.         Results Reviewed       Procedure Component Value Units Date/Time    HS Troponin 0hr (reflex protocol) [[id number]]  (Normal) Collected: 01/19/25 1445    Lab Status: Final result Specimen:  Blood from Arm, Right Updated: 01/19/25 1513     hs TnI 0hr 3 ng/L     HS Troponin I 2hr [[id number]]     Lab Status: No result Specimen: Blood     Comprehensive metabolic panel [[id number]]  (Abnormal) Collected: 01/19/25 1445    Lab Status: Final result Specimen: Blood from Arm, Right Updated: 01/19/25 1506     Sodium 136 mmol/L      Potassium 4.2 mmol/L      Chloride 101 mmol/L      CO2 29 mmol/L      ANION GAP 6 mmol/L      BUN 16 mg/dL      Creatinine 1.06 mg/dL      Glucose 171 mg/dL      Calcium 9.9 mg/dL      AST 19 U/L      ALT 14 U/L      Alkaline Phosphatase 108 U/L      Total Protein 7.4 g/dL      Albumin 4.6 g/dL      Total Bilirubin 0.94 mg/dL      eGFR 76 ml/min/1.73sq m     Narrative:      [employer] guidelines for Chronic Kidney Disease (CKD):     Stage 1 with normal or high GFR (GFR > 90 mL/min/1.73 square meters)    Stage 2 Mild CKD (GFR = 60-89 mL/min/1.73 square meters)    Stage 3A Moderate CKD (GFR = 45-59 mL/min/1.73 square meters)    Stage 3B Moderate CKD (GFR = 30-44 mL/min/1.73 square meters)    Stage 4 Severe CKD (GFR = 15-29 mL/min/1.73 square meters)    Stage 5 End Stage CKD (GFR <15 mL/min/1.73 square meters)  Note: GFR calculation is accurate only with a steady state creatinine    CBC and differential [[id number]] Collected: 01/19/25 1445    Lab Status: Final result Specimen: Blood from Arm, Right Updated: 01/19/25 1451     WBC 6.43 Thousand/uL      RBC 5.36 Million/uL      Hemoglobin 15.8 g/dL      Hematocrit 47.4 %      MCV 88 fL      MCH 29.5 pg      MCHC 33.3 g/dL      RDW 13.2 %      MPV 9.5 fL      Platelets 308 Thousands/uL      nRBC 0 /100 WBCs      Segmented % 62 %      Immature Grans % 0 %      Lymphocytes % 27 %      Monocytes % 8 %      Eosinophils Relative 2 %      Basophils Relative 1 %      Absolute Neutrophils 3.99 Thousands/µL      Absolute Immature Grans 0.02 Thousand/uL      Absolute Lymphocytes 1.73 Thousands/µL      Absolute Monocytes 0.52  Thousand/µL      Eosinophils Absolute 0.14 Thousand/µL      Basophils Absolute 0.03 Thousands/µL             No orders to display       Procedures    ED Medication and Procedure Management   Prior to Admission Medications   Prescriptions Last Dose Informant Patient Reported? Taking?   aspirin (ECOTRIN LOW STRENGTH) 81 mg EC tablet  Self Yes No   Sig: Take 81 mg by mouth daily   atorvastatin (LIPITOR) 40 mg tablet  Self Yes No   Sig: Take 40 mg by mouth daily   diazepam (VALIUM) 5 mg tablet   No No   Sig: Take 1 tablet (5 mg total) by mouth every 8 (eight) hours as needed for anxiety or muscle spasms for up to 16 doses   metFORMIN (GLUCOPHAGE) 500 mg tablet   Yes No   Sig: Take 500 mg by mouth 2 (two) times a day with meals   naproxen (NAPROSYN) 500 mg tablet   No No   Sig: Take 1 tablet (500 mg total) by mouth 2 (two) times a day as needed for mild pain or moderate pain   sertraline (ZOLOFT) 50 mg tablet   Yes No      Facility-Administered Medications: None     Discharge Medication List as of 1/19/2025  3:34 PM        START taking these medications    Details   amLODIPine (NORVASC) 5 mg tablet Take 1 tablet (5 mg total) by mouth daily, Starting Sun 1/19/2025, Normal           CONTINUE these medications which have NOT CHANGED    Details   aspirin (ECOTRIN LOW STRENGTH) 81 mg EC tablet Take 81 mg by mouth daily, Historical Med      atorvastatin (LIPITOR) 40 mg tablet Take 40 mg by mouth daily, Historical Med      diazepam (VALIUM) 5 mg tablet Take 1 tablet (5 mg total) by mouth every 8 (eight) hours as needed for anxiety or muscle spasms for up to 16 doses, Starting Fri 1/26/2024, Normal      metFORMIN (GLUCOPHAGE) 500 mg tablet Take 500 mg by mouth 2 (two) times a day with meals, Historical Med      naproxen (NAPROSYN) 500 mg tablet Take 1 tablet (500 mg total) by mouth 2 (two) times a day as needed for mild pain or moderate pain, Starting Fri 1/26/2024, Normal      sertraline (ZOLOFT) 50 mg tablet Starting Mon  1/21/2019, Historical Med           No discharge procedures on file.  ED SEPSIS DOCUMENTATION   Time reflects when diagnosis was documented in both MDM as applicable and the Disposition within this note       Time User Action Codes Description Comment    1/19/2025  3:19 PM Lea Recinos Add [I10] Hypertension                  Lea Recinos PA-C  01/19/25 1549

## 2025-01-20 LAB
ATRIAL RATE: 78 BPM
P AXIS: 64 DEGREES
PR INTERVAL: 170 MS
QRS AXIS: 26 DEGREES
QRSD INTERVAL: 90 MS
QT INTERVAL: 430 MS
QTC INTERVAL: 490 MS
T WAVE AXIS: 49 DEGREES
VENTRICULAR RATE: 78 BPM

## 2025-01-27 DIAGNOSIS — E78.5 HYPERLIPIDEMIA, UNSPECIFIED: ICD-10-CM

## 2025-01-27 DIAGNOSIS — I10 ESSENTIAL (PRIMARY) HYPERTENSION: ICD-10-CM

## 2025-01-27 DIAGNOSIS — Z82.49 FAMILY HISTORY OF ISCHEMIC HEART DISEASE AND OTHER DISEASES OF THE CIRCULATORY SYSTEM: ICD-10-CM

## 2025-01-27 DIAGNOSIS — E11.9 TYPE 2 DIABETES MELLITUS WITHOUT COMPLICATIONS (HCC): ICD-10-CM

## 2025-02-12 ENCOUNTER — HOSPITAL ENCOUNTER (OUTPATIENT)
Dept: CT IMAGING | Facility: HOSPITAL | Age: 60
Discharge: HOME | End: 2025-02-12

## 2025-02-19 ENCOUNTER — HOSPITAL ENCOUNTER (OUTPATIENT)
Dept: NON INVASIVE DIAGNOSTICS | Facility: HOSPITAL | Age: 60
Discharge: HOME | End: 2025-02-19
Payer: COMMERCIAL

## 2025-02-19 DIAGNOSIS — Z82.49 FAMILY HISTORY OF ISCHEMIC HEART DISEASE AND OTHER DISEASES OF THE CIRCULATORY SYSTEM: ICD-10-CM

## 2025-02-19 DIAGNOSIS — E78.5 HYPERLIPIDEMIA, UNSPECIFIED: ICD-10-CM

## 2025-02-19 DIAGNOSIS — I10 ESSENTIAL (PRIMARY) HYPERTENSION: ICD-10-CM

## 2025-02-19 DIAGNOSIS — E11.9 TYPE 2 DIABETES MELLITUS WITHOUT COMPLICATIONS (HCC): ICD-10-CM

## 2025-02-19 PROCEDURE — 93975 VASCULAR STUDY: CPT | Performed by: SURGERY

## 2025-02-19 PROCEDURE — 93975 VASCULAR STUDY: CPT

## 2025-02-26 ENCOUNTER — HOSPITAL ENCOUNTER (OUTPATIENT)
Dept: NON INVASIVE DIAGNOSTICS | Facility: HOSPITAL | Age: 60
Discharge: HOME | End: 2025-02-26
Payer: COMMERCIAL

## 2025-02-26 ENCOUNTER — HOSPITAL ENCOUNTER (OUTPATIENT)
Dept: CT IMAGING | Facility: HOSPITAL | Age: 60
Discharge: HOME | End: 2025-02-26
Payer: COMMERCIAL

## 2025-02-26 VITALS
DIASTOLIC BLOOD PRESSURE: 94 MMHG | HEART RATE: 70 BPM | WEIGHT: 182 LBS | HEIGHT: 73 IN | BODY MASS INDEX: 24.12 KG/M2 | SYSTOLIC BLOOD PRESSURE: 169 MMHG

## 2025-02-26 VITALS — RESPIRATION RATE: 18 BRPM | HEART RATE: 51 BPM | DIASTOLIC BLOOD PRESSURE: 70 MMHG | SYSTOLIC BLOOD PRESSURE: 120 MMHG

## 2025-02-26 DIAGNOSIS — E78.5 HYPERLIPIDEMIA, UNSPECIFIED: ICD-10-CM

## 2025-02-26 DIAGNOSIS — E78.5 HYPERLIPIDEMIA, UNSPECIFIED HYPERLIPIDEMIA TYPE: ICD-10-CM

## 2025-02-26 DIAGNOSIS — Z82.49 FAMILY HISTORY OF ISCHEMIC HEART DISEASE AND OTHER DISEASES OF THE CIRCULATORY SYSTEM: ICD-10-CM

## 2025-02-26 DIAGNOSIS — I10 ESSENTIAL (PRIMARY) HYPERTENSION: ICD-10-CM

## 2025-02-26 DIAGNOSIS — E11.9 TYPE 2 DIABETES MELLITUS WITHOUT COMPLICATION, UNSPECIFIED WHETHER LONG TERM INSULIN USE (HCC): ICD-10-CM

## 2025-02-26 DIAGNOSIS — E11.9 TYPE 2 DIABETES MELLITUS WITHOUT COMPLICATIONS (HCC): ICD-10-CM

## 2025-02-26 LAB
AORTIC ROOT: 3.2 CM
ASCENDING AORTA: 3.2 CM
BSA FOR ECHO PROCEDURE: 2.07 M2
E WAVE DECELERATION TIME: 268 MS
E/A RATIO: 0.82
FRACTIONAL SHORTENING: 35 (ref 28–44)
INTERVENTRICULAR SEPTUM IN DIASTOLE (PARASTERNAL SHORT AXIS VIEW): 0.9 CM
INTERVENTRICULAR SEPTUM: 0.9 CM (ref 0.6–1.1)
LAAS-AP2: 19.1 CM2
LAAS-AP4: 15.9 CM2
LEFT ATRIUM SIZE: 3.7 CM
LEFT ATRIUM VOLUME (MOD BIPLANE): 46 ML
LEFT ATRIUM VOLUME INDEX (MOD BIPLANE): 22.2 ML/M2
LEFT INTERNAL DIMENSION IN SYSTOLE: 3.2 CM (ref 2.1–4)
LEFT VENTRICLE DIASTOLIC VOLUME (MOD BIPLANE): 83 ML
LEFT VENTRICLE DIASTOLIC VOLUME INDEX (MOD BIPLANE): 40.1 ML/M2
LEFT VENTRICLE SYSTOLIC VOLUME (MOD BIPLANE): 29 ML
LEFT VENTRICLE SYSTOLIC VOLUME INDEX (MOD BIPLANE): 14 ML/M2
LEFT VENTRICULAR INTERNAL DIMENSION IN DIASTOLE: 4.9 CM (ref 3.5–6)
LEFT VENTRICULAR POSTERIOR WALL IN END DIASTOLE: 0.8 CM
LEFT VENTRICULAR STROKE VOLUME: 74 ML
LV EF BIPLANE MOD: 65 %
LV EF US.2D.A4C+ESTIMATED: 71 %
LVSV (TEICH): 74 ML
MV E'TISSUE VEL-LAT: 8 CM/S
MV E'TISSUE VEL-SEP: 6 CM/S
MV PEAK A VEL: 0.79 M/S
MV PEAK E VEL: 65 CM/S
MV STENOSIS PRESSURE HALF TIME: 78 MS
MV VALVE AREA P 1/2 METHOD: 2.82
RIGHT ATRIUM AREA SYSTOLE A4C: 12.3 CM2
RIGHT VENTRICLE ID DIMENSION: 3.5 CM
SL CV LEFT ATRIUM LENGTH A2C: 5.4 CM
SL CV LV EF: 65
SL CV PED ECHO LEFT VENTRICLE DIASTOLIC VOLUME (MOD BIPLANE) 2D: 115 ML
SL CV PED ECHO LEFT VENTRICLE SYSTOLIC VOLUME (MOD BIPLANE) 2D: 42 ML
TRICUSPID ANNULAR PLANE SYSTOLIC EXCURSION: 2.1 CM

## 2025-02-26 PROCEDURE — 75574 CT ANGIO HRT W/3D IMAGE: CPT

## 2025-02-26 PROCEDURE — 93306 TTE W/DOPPLER COMPLETE: CPT

## 2025-02-26 PROCEDURE — 93306 TTE W/DOPPLER COMPLETE: CPT | Performed by: INTERNAL MEDICINE

## 2025-02-26 PROCEDURE — 75580 N-INVAS EST C FFR SW ALY CTA: CPT

## 2025-02-26 RX ORDER — METOPROLOL TARTRATE 1 MG/ML
5 INJECTION, SOLUTION INTRAVENOUS
Status: DISCONTINUED | OUTPATIENT
Start: 2025-02-26 | End: 2025-03-02 | Stop reason: HOSPADM

## 2025-02-26 RX ORDER — NITROGLYCERIN 0.4 MG/1
0.8 TABLET SUBLINGUAL ONCE
Status: COMPLETED | OUTPATIENT
Start: 2025-02-26 | End: 2025-02-26

## 2025-02-26 RX ADMIN — IOHEXOL 68 ML: 350 INJECTION, SOLUTION INTRAVENOUS at 08:52

## 2025-02-26 RX ADMIN — NITROGLYCERIN 0.8 MG: 0.4 TABLET SUBLINGUAL at 08:49

## 2025-02-26 NOTE — NURSING NOTE
Patient arrived for coronary CT angiography. Test education reviewed with patient. Medications and allergies verified. Pt denies PDE5 inhibitor use. Patient took Metoprolol 100 mg as instructed by cardiology. SL nitro given per protocol. See vitals flowsheet. Tolerated test well. Instructed to increase fluid intake remainder of day. Vitals stable, patient asymptomatic upon departure.